# Patient Record
Sex: MALE | Race: AMERICAN INDIAN OR ALASKA NATIVE | NOT HISPANIC OR LATINO | URBAN - METROPOLITAN AREA
[De-identification: names, ages, dates, MRNs, and addresses within clinical notes are randomized per-mention and may not be internally consistent; named-entity substitution may affect disease eponyms.]

---

## 2019-06-12 ENCOUNTER — EMERGENCY (EMERGENCY)
Facility: HOSPITAL | Age: 59
LOS: 1 days | Discharge: HOME | End: 2019-06-12
Attending: EMERGENCY MEDICINE
Payer: COMMERCIAL

## 2019-06-12 VITALS
WEIGHT: 128.97 LBS | DIASTOLIC BLOOD PRESSURE: 80 MMHG | TEMPERATURE: 97 F | SYSTOLIC BLOOD PRESSURE: 129 MMHG | HEART RATE: 89 BPM | OXYGEN SATURATION: 100 % | RESPIRATION RATE: 17 BRPM

## 2019-06-12 DIAGNOSIS — Z98.890 OTHER SPECIFIED POSTPROCEDURAL STATES: Chronic | ICD-10-CM

## 2019-06-12 PROCEDURE — 96374 THER/PROPH/DIAG INJ IV PUSH: CPT

## 2019-06-12 PROCEDURE — 73030 X-RAY EXAM OF SHOULDER: CPT

## 2019-06-12 PROCEDURE — 73030 X-RAY EXAM OF SHOULDER: CPT | Mod: 26,RT

## 2019-06-12 PROCEDURE — 99284 EMERGENCY DEPT VISIT MOD MDM: CPT | Mod: 25

## 2019-06-12 PROCEDURE — 99284 EMERGENCY DEPT VISIT MOD MDM: CPT

## 2019-06-12 RX ORDER — MIRTAZAPINE 45 MG/1
0 TABLET, ORALLY DISINTEGRATING ORAL
Qty: 0 | Refills: 0 | DISCHARGE

## 2019-06-12 RX ORDER — KETOROLAC TROMETHAMINE 30 MG/ML
15 SYRINGE (ML) INJECTION ONCE
Refills: 0 | Status: DISCONTINUED | OUTPATIENT
Start: 2019-06-12 | End: 2019-06-12

## 2019-06-12 RX ORDER — LIDOCAINE 4 G/100G
1 CREAM TOPICAL ONCE
Refills: 0 | Status: COMPLETED | OUTPATIENT
Start: 2019-06-12 | End: 2019-06-12

## 2019-06-12 RX ORDER — DIAZEPAM 5 MG
5 TABLET ORAL ONCE
Refills: 0 | Status: DISCONTINUED | OUTPATIENT
Start: 2019-06-12 | End: 2019-06-12

## 2019-06-12 RX ORDER — TRAZODONE HCL 50 MG
0 TABLET ORAL
Qty: 0 | Refills: 0 | DISCHARGE

## 2019-06-12 RX ORDER — CLONAZEPAM 1 MG
0 TABLET ORAL
Qty: 0 | Refills: 0 | DISCHARGE

## 2019-06-12 RX ORDER — OXYCODONE AND ACETAMINOPHEN 5; 325 MG/1; MG/1
1 TABLET ORAL ONCE
Refills: 0 | Status: DISCONTINUED | OUTPATIENT
Start: 2019-06-12 | End: 2019-06-12

## 2019-06-12 RX ADMIN — OXYCODONE AND ACETAMINOPHEN 1 TABLET(S): 5; 325 TABLET ORAL at 12:51

## 2019-06-12 RX ADMIN — Medication 15 MILLIGRAM(S): at 10:17

## 2019-06-12 RX ADMIN — Medication 5 MILLIGRAM(S): at 10:17

## 2019-06-12 RX ADMIN — Medication 5 MILLIGRAM(S): at 12:54

## 2019-06-12 RX ADMIN — LIDOCAINE 1 PATCH: 4 CREAM TOPICAL at 10:17

## 2019-06-12 RX ADMIN — Medication 15 MILLIGRAM(S): at 11:00

## 2019-06-12 NOTE — ED PROVIDER NOTE - OBJECTIVE STATEMENT
59 y/o M pt with a PMHx of depression, dystonia, insomnia, and no significant PSHx, presents to the ED with complaints of right shoulder pain x3 days. Patient reports he lifted a heavy bag 6 days ago and then went to his PCP who gave him injections and pain resolved but then reoccurred. Patient reports he has pain with movement and took pain medication with no relief. Patient denies trauma, chest pain, shortness of breath, lightheadedness or any other symptoms. NKDA.

## 2019-06-12 NOTE — ED ADULT NURSE NOTE - CHPI ED NUR SYMPTOMS NEG
no fever/no change in level of consciousness/no confusion/no weakness/no vomiting/no loss of consciousness no weakness

## 2019-06-12 NOTE — ED PROVIDER NOTE - CLINICAL SUMMARY MEDICAL DECISION MAKING FREE TEXT BOX
Will check x-ray, give NSAIDs and recommend physical therapy outpatient if there is no real improvement.

## 2019-06-12 NOTE — ED ADULT NURSE NOTE - NSIMPLEMENTINTERV_GEN_ALL_ED
Implemented All Fall Risk Interventions:  [place] to call system. Call bell, personal items and telephone within reach. Instruct patient to call for assistance. Room bathroom lighting operational. Non-slip footwear when patient is off stretcher. Physically safe environment: no spills, clutter or unnecessary equipment. Stretcher in lowest position, wheels locked, appropriate side rails in place. Provide visual cue, wrist band, yellow gown, etc. Monitor gait and stability. Monitor for mental status changes and reorient to person, place, and time. Review medications for side effects contributing to fall risk. Reinforce activity limits and safety measures with patient and family. Implemented All Universal Safety Interventions:  [place] to call system. Call bell, personal items and telephone within reach. Instruct patient to call for assistance. Room bathroom lighting operational. Non-slip footwear when patient is off stretcher. Physically safe environment: no spills, clutter or unnecessary equipment. Stretcher in lowest position, wheels locked, appropriate side rails in place.

## 2019-06-12 NOTE — ED ADULT NURSE NOTE - PMH
No pertinent past medical history <<----- Click to add NO pertinent Past Medical History Depression, unspecified depression type    Dystonia    Insomnia, unspecified type

## 2020-09-22 NOTE — ED PROVIDER NOTE - CPE EDP RESP NORM
Corneal Abrasion    You have received a scratch or scrape (abrasion) to your cornea. The cornea is the clear part in the front of the eye. This sensitive area is very painful when injured. You may make tears frequently, and your vision may be blurry until the injury heals. You may be sensitive to light.  This part of the body heals quickly. You can expect the pain to go away within 24 to 48 hours. If the abrasion is large or deep, your doctor may apply an eye patch, although this is not always done. An antibiotic ointment or eye drops may also be used to prevent infection.  Numbing drops may be used to relieve the pain temporarily so that your eyes can be examined. However, these drops cannot be prescribed for home use because that would prevent healing and lead to more serious problems. Also, if you cant feel your eye, there is a chance of accidentally injuring it further without knowing it.  Home care  · A cold pack (ice in a plastic bag, wrapped in a towel) may be applied over the eye (or eye patch) for 20 minutes at a time, to reduce pain.  · You may use acetaminophen or ibuprofen to control pain, unless another pain medicine was prescribed. Note: If you have chronic liver or kidney disease or ever had a stomach ulcer or GI bleeding, talk with your doctor before using these medicines.  · Rest your eyes and do not read until symptoms are gone.  · If you use contact lenses, do not wear them until all symptoms are gone.  · If your vision is affected by the corneal abrasion or if an eye patch was applied, do not drive a motor vehicle or operate machinery until all symptoms are gone. You may have trouble judging distances using only one eye.  · If your eyes are sensitive to light, try wearing sunglasses, or stay indoors until symptoms go away.  Follow-up care  Follow up with your health care provider, or as advised.  · If no patch was put on your eye, and used but the pain continues for more than 48 hours, you  should have another exam. Return to this facility or contact your health care provider to arrange this.  · If your eye was patched and you were asked to remove the patch yourself, see your health care provider. You may also return to this facility if you still have pain after the patch is removed.  · If you were given a return appointment for patch removal and re-examination, be sure to keep the appointment. Leaving the patch in place longer than advised could be harmful.  When to seek medical advice  Call your health care provider right away if any of these occur.  · Increasing eye pain or pain that does not improve after 24 hours  · Discharge from the eye  · Increasing redness of the eye or swelling of the eyelids  · Worsening vision  · Symptoms that worsen after the abrasion has healed  Date Last Reviewed: 6/14/2015  © 9384-0974 [employer] [place]. [address] [place] [address], [place] [address]. All rights reserved. This information is not intended as a substitute for professional medical care. Always follow your healthcare professional's instructions.         normal...

## 2023-11-29 PROBLEM — G47.00 INSOMNIA, UNSPECIFIED: Chronic | Status: ACTIVE | Noted: 2019-06-12

## 2023-11-29 PROBLEM — G24.9 DYSTONIA, UNSPECIFIED: Chronic | Status: ACTIVE | Noted: 2019-06-12

## 2023-11-29 PROBLEM — F32.9 MAJOR DEPRESSIVE DISORDER, SINGLE EPISODE, UNSPECIFIED: Chronic | Status: ACTIVE | Noted: 2019-06-12

## 2023-11-30 ENCOUNTER — APPOINTMENT (OUTPATIENT)
Dept: CARDIOLOGY | Facility: CLINIC | Age: 63
End: 2023-11-30

## 2023-11-30 PROBLEM — Z00.00 ENCOUNTER FOR PREVENTIVE HEALTH EXAMINATION: Status: ACTIVE | Noted: 2023-11-30

## 2023-12-05 ENCOUNTER — APPOINTMENT (OUTPATIENT)
Dept: CARDIOLOGY | Facility: CLINIC | Age: 63
End: 2023-12-05
Payer: MEDICAID

## 2023-12-05 ENCOUNTER — NON-APPOINTMENT (OUTPATIENT)
Age: 63
End: 2023-12-05

## 2023-12-05 VITALS
OXYGEN SATURATION: 98 % | HEART RATE: 88 BPM | WEIGHT: 138 LBS | TEMPERATURE: 97.8 F | HEIGHT: 66 IN | DIASTOLIC BLOOD PRESSURE: 81 MMHG | BODY MASS INDEX: 22.18 KG/M2 | SYSTOLIC BLOOD PRESSURE: 135 MMHG

## 2023-12-05 DIAGNOSIS — Z87.898 PERSONAL HISTORY OF OTHER SPECIFIED CONDITIONS: ICD-10-CM

## 2023-12-05 DIAGNOSIS — Z72.0 TOBACCO USE: ICD-10-CM

## 2023-12-05 DIAGNOSIS — G24.4 IDIOPATHIC OROFACIAL DYSTONIA: ICD-10-CM

## 2023-12-05 DIAGNOSIS — E11.9 TYPE 2 DIABETES MELLITUS W/OUT COMPLICATIONS: ICD-10-CM

## 2023-12-05 DIAGNOSIS — J44.9 CHRONIC OBSTRUCTIVE PULMONARY DISEASE, UNSPECIFIED: ICD-10-CM

## 2023-12-05 DIAGNOSIS — Z83.3 FAMILY HISTORY OF DIABETES MELLITUS: ICD-10-CM

## 2023-12-05 DIAGNOSIS — Z86.59 PERSONAL HISTORY OF OTHER MENTAL AND BEHAVIORAL DISORDERS: ICD-10-CM

## 2023-12-05 PROCEDURE — 99204 OFFICE O/P NEW MOD 45 MIN: CPT

## 2023-12-05 PROCEDURE — 93000 ELECTROCARDIOGRAM COMPLETE: CPT

## 2023-12-05 RX ORDER — CLONAZEPAM 2 MG/1
TABLET ORAL
Refills: 0 | Status: ACTIVE | COMMUNITY

## 2023-12-05 RX ORDER — TRAZODONE HYDROCHLORIDE 300 MG/1
TABLET ORAL
Refills: 0 | Status: ACTIVE | COMMUNITY

## 2023-12-05 RX ORDER — LOSARTAN POTASSIUM 25 MG/1
25 TABLET, FILM COATED ORAL
Refills: 0 | Status: ACTIVE | COMMUNITY

## 2023-12-05 RX ORDER — FLUTICASONE PROPION/SALMETEROL 500-50 MCG
BLISTER, WITH INHALATION DEVICE INHALATION
Refills: 0 | Status: ACTIVE | COMMUNITY

## 2023-12-05 RX ORDER — ATORVASTATIN CALCIUM 40 MG/1
40 TABLET, FILM COATED ORAL
Refills: 0 | Status: ACTIVE | COMMUNITY

## 2023-12-05 RX ORDER — METFORMIN HYDROCHLORIDE 625 MG/1
TABLET ORAL
Refills: 0 | Status: ACTIVE | COMMUNITY

## 2023-12-05 RX ORDER — MIRTAZAPINE 30 MG/1
30 TABLET, FILM COATED ORAL
Refills: 0 | Status: ACTIVE | COMMUNITY

## 2024-01-05 ENCOUNTER — NON-APPOINTMENT (OUTPATIENT)
Age: 64
End: 2024-01-05

## 2024-05-13 NOTE — ED ADULT NURSE NOTE - PAIN: DESCRIPTION (FREQUENCY/QUALITY)
Duration Of Freeze Thaw-Cycle (Seconds): 1 Render Note In Bullet Format When Appropriate: No Show Applicator Variable?: Yes Detail Level: Detailed Post-Care Instructions: I reviewed with the patient in detail post-care instructions. Patient is to wear sunprotection, and avoid picking at any of the treated lesions. Pt may apply Vaseline to crusted or scabbing areas. Consent: The patient's consent was obtained including but not limited to risks of crusting, scabbing, blistering, scarring, darker or lighter pigmentary change, recurrence, incomplete removal and infection. Number Of Freeze-Thaw Cycles: 2 freeze-thaw cycles Spray Paint Text: The liquid nitrogen was applied to the skin utilizing a spray paint frosting technique. Medical Necessity Information: It is in your best interest to select a reason for this procedure from the list below. All of these items fulfill various CMS LCD requirements except the new and changing color options. Medical Necessity Clause: This procedure was medically necessary because the lesions that were treated were: constant/pounding throbbing/constant

## 2024-06-10 ENCOUNTER — APPOINTMENT (OUTPATIENT)
Dept: CARDIOLOGY | Facility: CLINIC | Age: 64
End: 2024-06-10
Payer: MEDICARE

## 2024-06-10 VITALS
HEART RATE: 64 BPM | OXYGEN SATURATION: 100 % | DIASTOLIC BLOOD PRESSURE: 79 MMHG | BODY MASS INDEX: 22.44 KG/M2 | SYSTOLIC BLOOD PRESSURE: 134 MMHG | TEMPERATURE: 97.2 F | WEIGHT: 139 LBS

## 2024-06-10 DIAGNOSIS — I10 ESSENTIAL (PRIMARY) HYPERTENSION: ICD-10-CM

## 2024-06-10 DIAGNOSIS — E78.5 HYPERLIPIDEMIA, UNSPECIFIED: ICD-10-CM

## 2024-06-10 PROCEDURE — G2211 COMPLEX E/M VISIT ADD ON: CPT

## 2024-06-10 PROCEDURE — 99214 OFFICE O/P EST MOD 30 MIN: CPT

## 2024-06-10 NOTE — PHYSICAL EXAM
[de-identified] : General: No acute distress HEENT: EOMI  Neck: Supple, No JVD, no bruits Lungs: Clear to auscultation bilaterally; No rales or wheezing Heart: Regular rate and rhythm; No murmurs, rubs, or gallops Abdomen: Nontender, bowel sounds present Extremities: No clubbing, cyanosis, or edema Nervous system:  Alert & Oriented X3, no focal deficits Psychiatric: Normal affect Skin: No rashes or lesions

## 2024-06-10 NOTE — HISTORY OF PRESENT ILLNESS
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  Patient reports he has been feeling episodes of lightheadedness for the past month, not associated with any chest pain, dyspnea, palpitations, or syncope. Denies LE edema, orthopnea, or PND.  Patient saw his neurologist for this and was started on meclizine.  Upon further questioning, patient is not sure whether the symptoms are more consistent with lightheadedness versus vertigo or combination of both.  Blood pressure runs in the 130s systolic at home.  Follows with his PMD regularly, Dr. Krupa Pope  Labs 11/28/2023   HDL 45 LDL 48 Trigs 111  A1C 6.5% TSH WNL LFTs WNL BUN/Cr WNL

## 2024-06-10 NOTE — ASSESSMENT
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   1. HTN: Seems well-controlled on losartan monotherapy.  Blood pressures in the 130s now, the previously was lower in the 110s and less, so we will have patient continue the current dose.  Patient's symptoms of lightheadedness do not sound as if they are related to his heart.  I have asked him that when an episode occurs to check his blood pressure to make sure there is no hypotension at the same time, that this does not seem like it is at this point.  2. HLD: Will have patient continue on atorvastatin, most recent lipid panel is satisfactory  Follow-up visit in 6 months or as needed.  This note was generated using Dragon medical dictation software.  A reasonable effort has been made for proofreading its contents but typos may still remain.  If there are any questions or points of clarification needed, please notify the office.

## 2024-12-30 ENCOUNTER — APPOINTMENT (OUTPATIENT)
Dept: CARDIOLOGY | Facility: CLINIC | Age: 64
End: 2024-12-30